# Patient Record
Sex: FEMALE | Race: WHITE
[De-identification: names, ages, dates, MRNs, and addresses within clinical notes are randomized per-mention and may not be internally consistent; named-entity substitution may affect disease eponyms.]

---

## 2018-01-23 ENCOUNTER — HOSPITAL ENCOUNTER (EMERGENCY)
Dept: HOSPITAL 24 - ER | Age: 68
Discharge: HOME | End: 2018-01-23
Payer: COMMERCIAL

## 2018-01-23 VITALS — BODY MASS INDEX: 30.2 KG/M2

## 2018-01-23 VITALS — SYSTOLIC BLOOD PRESSURE: 129 MMHG | DIASTOLIC BLOOD PRESSURE: 58 MMHG

## 2018-01-23 DIAGNOSIS — N20.0: Primary | ICD-10-CM

## 2018-01-23 LAB
ALBUMIN SERPL BCP-MCNC: 3.6 G/DL (ref 3.4–5)
ALP SERPL-CCNC: 98 UNITS/L (ref 46–116)
ALT SERPL W P-5'-P-CCNC: 31 UNITS/L (ref 12–78)
AST SERPL W P-5'-P-CCNC: 16 UNITS/L (ref 15–37)
BUN SERPL-MCNC: 41 MG/DL (ref 7–18)
CALCIUM ALBUM COR SERPL-SCNC: (no result) MG/DL (ref 8.5–10.1)
CALCIUM ALBUM COR SERPL-SCNC: (no result) MMOL/L (ref 136–145)
CALCIUM SERPL-MCNC: 8.7 MG/DL (ref 8.5–10.1)
CHLORIDE SERPL-SCNC: 107 MMOL/L (ref 98–107)
CO2 SERPL-SCNC: 21.5 MMOL/L (ref 21–32)
CREAT SERPL-MCNC: 1.39 MG/DL (ref 0.55–1.02)
EGFR  BLACK RACES: 48 (ref 60–?)
PROT SERPL-MCNC: 7.6 G/DL (ref 6.4–8.2)
SODIUM SERPL-SCNC: 137 MMOL/L (ref 136–145)

## 2018-01-23 PROCEDURE — 36415 COLL VENOUS BLD VENIPUNCTURE: CPT

## 2018-01-23 PROCEDURE — 99282 EMERGENCY DEPT VISIT SF MDM: CPT

## 2018-01-23 PROCEDURE — 99283 EMERGENCY DEPT VISIT LOW MDM: CPT

## 2018-01-23 PROCEDURE — 96372 THER/PROPH/DIAG INJ SC/IM: CPT

## 2018-01-23 PROCEDURE — 74176 CT ABD & PELVIS W/O CONTRAST: CPT

## 2018-01-23 PROCEDURE — 80053 COMPREHEN METABOLIC PANEL: CPT

## 2018-01-23 NOTE — CT
CT OF THE ABDOMEN AND PELVIS WITHOUT CONTRAST



HISTORY: Hematuria and abdominal pain



Comparison: 08/31/2016



Technique:



Multiple axial images of the abdomen and pelvis were obtained from the lung bases to the pubic symphy
sis without the administration of IV contrast.  Oral contrast was administered. Dose reduction techni
ques including Automated Exposure Control (AEC) and adjustment of mA and kV were utlized.



Findings:



The heart is normal in size. There is no pericardial effusion. Lung bases are clear without focal con
solidation, pleural effusion or pneumothorax.  



The sensitivity for focal lesion detection within the solid abdominal viscera is diminished without t
he use of IV contrast.



Liver and spleen are normal in size, and contour. No focal lesions. No ductal dilitation. Gallbladder
 absent. The pancreas is unremarkable. Adrenal glands are normal. 1.2 cm stone in the left renal yumiko
ecting system on series 3, image 32 without definite hydronephrosis.  2 is stacked stones in the dist
al left ureter best seen on series 6, image 36 measuring 5 and 6 mm apiece. No definite evidence of h
ydroureter on the left



No bowel obstruction or inflammation.  No abnormal appearing mesenteric or retroperitoneal lymph node
s. No free fluid or fluid collections. 



The bladder is normal in appearance. Uterus not well seen. No free fluid or abnormal pelvic lymph nod
es. 



No aggressive osseous lesions.  



IMPRESSION:

  

1.  Two distal left ureteral stones measuring 5 and 6 mm a piece. No definite evidence of hydronephro
sis or hydroureter. There is also a 1.2 cm stone in the collecting system of the left kidney. 





Reported By:Electronically Signed by BEHZAD DAS MD at 1/23/2018 11:51:05 AM

## 2018-01-23 NOTE — DR.GENAD
HPI





- PCP


Primary Care Physician: JIM MARTINI





- Complaint/Symptoms


Chief Complaint:: PT C/O HAVING BACK PAIN AND TROUBLE URINATING AND PT PLACED 

ON ABX PER DR. LITTLE AND C/O THAT SHE IS NOT GETTING BETTER AND THAT SHE SAW 

MD ON 01/22/18 AND THERE WAS BLOOD IN HER UA AND A CT WAS TO BE ORDERED AND SHE 

HAS NOT HEARD ANYTHING.. PT STATES SHE COULD NOT WAIT AND SHE IS HAVING ISSUES 

WALKING.


Self Treatment fo Chief Complaint: AMOXICILLIN..





- Nurses notes reviewed


Nurses Notes Review: Yes





- Source


History Provided: Patient





- Mode of Arrival


Mode of Arrival: Ambulatory





- Timing


Onset of Chief Complaint: 12/23/17





PMH





- PMH


Past Medical History: Yes


Past Medical History: COPD, Depression, Diabetes, Dyslipidemia, GERD, 

Hypertension, Kidney Stones


Past Surgical History: Yes


Surgical History: Cholecystectomy, Hysterectomy





- Family History


History of Family Medical Conditions: Yes


Family Medical History: Diabetes Mellitus, Cancer, MI, Hypertension





- Social History


Does patient currently use any type of tobacco product: Yes


Have you used tobacco products in the last 12 months: Yes


Type of Tobacco Use: Cigarettes


How many years tobacco product used: 53


Does any household member use tobacco: No


Alcohol Use: None


Do you use any recreational Drugs:: No


Lives With: Family


Lives Where: Home





- infectious screening


In the last 2 months have you had wt loss of >10#?: NO


Have you had fever, night sweats or hemotysis?: No


Have you traveled outside the country in the last 6 months?: No


Isolation: Airborn/Negative Pressure





PE





- Vital Signs


Vitals: 


 





Temperature                      97.2 F


Pulse Rate                       67


Respiratory Rate                 20


Blood Pressure [Left Arm]        150/69


Blood Pressure [Right Arm]       153/70


Blood Pressure                   129/58


O2 Sat by Pulse Oximetry         98











- Discharge Plan


Condition: Stable


Prescriptions: 


Acetaminophen with Codeine [Tylenol/Codeine #3 300-30 mg] 1 tab PO Q6H PRN #15 

tab


 PRN Reason: Pain


Tamsulosin HCl [Flomax] 0.4 mg PO DAILY #30 cap





- Follow ups/Referrals


Follow ups/Referrals: 


Maynor Little [Primary Care Provider] - 3 days





- Instructions


Instructions:  Kidney Stones, Easy-to-Read


Additional Instructions: 


RETURN TO ED IF WORSE. FOLLOW UP WITH YOUR UROLOGIST THIS WEEK.